# Patient Record
Sex: MALE | Race: WHITE | NOT HISPANIC OR LATINO | URBAN - METROPOLITAN AREA
[De-identification: names, ages, dates, MRNs, and addresses within clinical notes are randomized per-mention and may not be internally consistent; named-entity substitution may affect disease eponyms.]

---

## 2021-09-27 ENCOUNTER — APPOINTMENT (RX ONLY)
Dept: URBAN - METROPOLITAN AREA CLINIC 80 | Facility: CLINIC | Age: 31
Setting detail: DERMATOLOGY
End: 2021-09-27

## 2021-09-27 DIAGNOSIS — L0391 CELLULITIS AND ABSCESS OF UNSPECIFIED SITES: ICD-10-CM

## 2021-09-27 DIAGNOSIS — L0390 CELLULITIS AND ABSCESS OF UNSPECIFIED SITES: ICD-10-CM

## 2021-09-27 PROBLEM — L02.811 CUTANEOUS ABSCESS OF HEAD [ANY PART, EXCEPT FACE]: Status: ACTIVE | Noted: 2021-09-27

## 2021-09-27 PROBLEM — H60.01 ABSCESS OF RIGHT EXTERNAL EAR: Status: ACTIVE | Noted: 2021-09-27

## 2021-09-27 PROCEDURE — ? PRESCRIPTION

## 2021-09-27 PROCEDURE — ? ADDITIONAL NOTES

## 2021-09-27 PROCEDURE — ? DEFER

## 2021-09-27 PROCEDURE — ? RECOMMENDATIONS

## 2021-09-27 PROCEDURE — ? COUNSELING

## 2021-09-27 PROCEDURE — 99203 OFFICE O/P NEW LOW 30 MIN: CPT

## 2021-09-27 RX ORDER — CEPHALEXIN 500 MG/1
CAPSULE ORAL
Qty: 21 | Refills: 0 | Status: ERX | COMMUNITY
Start: 2021-09-27

## 2021-09-27 RX ORDER — MUPIROCIN 20 MG/G
OINTMENT TOPICAL BID
Qty: 22 | Refills: 2 | Status: ERX | COMMUNITY
Start: 2021-09-27

## 2021-09-27 RX ADMIN — CEPHALEXIN: 500 CAPSULE ORAL at 00:00

## 2021-09-27 RX ADMIN — MUPIROCIN: 20 OINTMENT TOPICAL at 00:00

## 2021-09-27 ASSESSMENT — LOCATION DETAILED DESCRIPTION DERM
LOCATION DETAILED: LEFT INFERIOR POSTAURICULAR SKIN
LOCATION DETAILED: RIGHT POSTAURICULAR CREASE

## 2021-09-27 ASSESSMENT — LOCATION SIMPLE DESCRIPTION DERM
LOCATION SIMPLE: RIGHT EAR
LOCATION SIMPLE: SCALP

## 2021-09-27 ASSESSMENT — LOCATION ZONE DERM
LOCATION ZONE: SCALP
LOCATION ZONE: EAR

## 2021-09-27 NOTE — PROCEDURE: DEFER
Introduction Text (Please End With A Colon): The following procedure was deferred: Excision
Procedure To Be Performed At Next Visit: Excision
Detail Level: Detailed

## 2021-09-27 NOTE — HPI: CYST
How Severe Is Your Cyst?: moderate
Is This A New Presentation, Or A Follow-Up?: Cysts
Additional History: Patient has been applying Bacitracin and Mometasone cream

## 2021-09-27 NOTE — PROCEDURE: RECOMMENDATIONS
Render Risk Assessment In Note?: no
Recommendation Preamble: The following recommendations were made during the visit: warm compresses
Detail Level: Zone

## 2025-04-07 ENCOUNTER — APPOINTMENT (OUTPATIENT)
Dept: URBAN - METROPOLITAN AREA CLINIC 79 | Facility: CLINIC | Age: 35
Setting detail: DERMATOLOGY
End: 2025-04-07

## 2025-04-07 DIAGNOSIS — T1490XA CONTUSION OF UNSPECIFIED SITE: ICD-10-CM | Status: INADEQUATELY CONTROLLED

## 2025-04-07 DIAGNOSIS — Q17.8 OTHER SPECIFIED CONGENITAL MALFORMATIONS OF EAR: ICD-10-CM

## 2025-04-07 PROBLEM — D48.5 NEOPLASM OF UNCERTAIN BEHAVIOR OF SKIN: Status: ACTIVE | Noted: 2025-04-07

## 2025-04-07 PROBLEM — S00.431A CONTUSION OF RIGHT EAR, INITIAL ENCOUNTER: Status: ACTIVE | Noted: 2025-04-07

## 2025-04-07 PROCEDURE — ? COUNSELING

## 2025-04-07 PROCEDURE — 99202 OFFICE O/P NEW SF 15 MIN: CPT | Mod: 25

## 2025-04-07 PROCEDURE — ? ADDITIONAL NOTES

## 2025-04-07 PROCEDURE — ? INCISION AND DRAINAGE OF AURICULAR HEMATOMA OR ABSCESS

## 2025-04-07 PROCEDURE — 69000 DRG XTRNL EAR ABSC/HEM SMPL: CPT | Mod: RT

## 2025-04-07 PROCEDURE — ? PRESCRIPTION MEDICATION MANAGEMENT

## 2025-04-07 PROCEDURE — ? PRESCRIPTION

## 2025-04-07 RX ORDER — CIPROFLOXACIN 500 MG/1
TABLET, FILM COATED ORAL
Qty: 14 | Refills: 0 | Status: ERX | COMMUNITY
Start: 2025-04-07

## 2025-04-07 RX ADMIN — CIPROFLOXACIN: 500 TABLET, FILM COATED ORAL at 00:00

## 2025-04-07 ASSESSMENT — LOCATION ZONE DERM: LOCATION ZONE: EAR

## 2025-04-07 ASSESSMENT — LOCATION SIMPLE DESCRIPTION DERM: LOCATION SIMPLE: RIGHT EAR

## 2025-04-07 ASSESSMENT — LOCATION DETAILED DESCRIPTION DERM: LOCATION DETAILED: RIGHT INFERIOR CRUS OF ANTIHELIX

## 2025-04-07 NOTE — PROCEDURE: INCISION AND DRAINAGE OF AURICULAR HEMATOMA OR ABSCESS
Detail Level: Detailed
Procedure: Simple incision and drainage of auricular hematoma
Medical Necessity Clause: The procedure was medically necessary due to one or more of the following: infection, severe pain, erythema, and warmth. These symptoms are either too severe to respond to conservative measures or have failed conservative measures, and, therefore, procedural intervention is medically indicated
Other Scalpel: 25 gauge needle aspiration
Other Bolster: dental roll and hypafix pressure dressing
Complications?: No
Consent: The risks, benefits, complications, and alternatives of incision and drainage of an auricular hematoma were discussed in detail.  Risks and complications include but are not limited to: pain, infection, bleeding, recurrence / reaccumulation of the hematoma or seroma, cosmetic deformity / cauliflower ear, and inability to reverse prior cartilage damage, scarring, or loss, among others.

## 2025-04-07 NOTE — PROCEDURE: ADDITIONAL NOTES
Additional Notes: 2 cc of sanguineous fluid drained via 25 gauge needle from the ear; consistent with auricular hematoma from patient traumatizing the ear (trying to pop a potential auricular pseudocyst in the area).  Pressure bandage applied and patient instructed to keep it on for 2 days to prevent blood/fluid from filling the dead space. 
Render Risk Assessment In Note?: no
Detail Level: Detailed

## 2025-04-08 ENCOUNTER — APPOINTMENT (OUTPATIENT)
Dept: URBAN - METROPOLITAN AREA CLINIC 52 | Facility: CLINIC | Age: 35
Setting detail: DERMATOLOGY
End: 2025-04-08

## 2025-04-08 DIAGNOSIS — T1490XA CONTUSION OF UNSPECIFIED SITE: ICD-10-CM | Status: INADEQUATELY CONTROLLED

## 2025-04-08 DIAGNOSIS — L72.8 OTHER FOLLICULAR CYSTS OF THE SKIN AND SUBCUTANEOUS TISSUE: ICD-10-CM | Status: INADEQUATELY CONTROLLED

## 2025-04-08 PROBLEM — S00.431A CONTUSION OF RIGHT EAR, INITIAL ENCOUNTER: Status: ACTIVE | Noted: 2025-04-08

## 2025-04-08 PROCEDURE — ? INCISION AND DRAINAGE OF AURICULAR HEMATOMA OR ABSCESS

## 2025-04-08 PROCEDURE — ? PRESCRIPTION MEDICATION MANAGEMENT

## 2025-04-08 PROCEDURE — ? ADDITIONAL NOTES

## 2025-04-08 PROCEDURE — 69000 DRG XTRNL EAR ABSC/HEM SMPL: CPT | Mod: RT

## 2025-04-08 PROCEDURE — ? COUNSELING

## 2025-04-08 PROCEDURE — 99214 OFFICE O/P EST MOD 30 MIN: CPT | Mod: 25

## 2025-04-08 ASSESSMENT — LOCATION ZONE DERM: LOCATION ZONE: EAR

## 2025-04-08 ASSESSMENT — LOCATION SIMPLE DESCRIPTION DERM: LOCATION SIMPLE: RIGHT EAR

## 2025-04-08 ASSESSMENT — LOCATION DETAILED DESCRIPTION DERM: LOCATION DETAILED: RIGHT INFERIOR CRUS OF ANTIHELIX

## 2025-04-08 NOTE — PROCEDURE: ADDITIONAL NOTES
Additional Notes: Reaccumulation from yesterday's drain\\n\\n2mL of sanguineous fluid drained via 18 gauge needle from the ear >> left open.\\n\\nContinue to suspect auricular hematoma s/p trauma\\n\\nPressure bandage applied and patient instructed to keep it on for 24 hours to prevent blood/fluid from filling the dead space.
Render Risk Assessment In Note?: no
Detail Level: Detailed

## 2025-04-10 ENCOUNTER — APPOINTMENT (OUTPATIENT)
Dept: URBAN - METROPOLITAN AREA CLINIC 52 | Facility: CLINIC | Age: 35
Setting detail: DERMATOLOGY
End: 2025-04-10

## 2025-04-10 DIAGNOSIS — L81.4 OTHER MELANIN HYPERPIGMENTATION: ICD-10-CM

## 2025-04-10 DIAGNOSIS — D18.0 HEMANGIOMA: ICD-10-CM

## 2025-04-10 DIAGNOSIS — L82.1 OTHER SEBORRHEIC KERATOSIS: ICD-10-CM

## 2025-04-10 PROBLEM — D18.01 HEMANGIOMA OF SKIN AND SUBCUTANEOUS TISSUE: Status: ACTIVE | Noted: 2025-04-10

## 2025-04-10 PROCEDURE — 99213 OFFICE O/P EST LOW 20 MIN: CPT | Mod: 24

## 2025-04-10 PROCEDURE — ? COUNSELING

## 2025-04-10 ASSESSMENT — LOCATION ZONE DERM: LOCATION ZONE: HAND

## 2025-04-10 ASSESSMENT — LOCATION DETAILED DESCRIPTION DERM
LOCATION DETAILED: RIGHT RADIAL DORSAL HAND
LOCATION DETAILED: RIGHT ULNAR DORSAL HAND

## 2025-04-10 ASSESSMENT — LOCATION SIMPLE DESCRIPTION DERM: LOCATION SIMPLE: RIGHT HAND

## 2025-04-12 ENCOUNTER — RX ONLY (RX ONLY)
Age: 35
End: 2025-04-12

## 2025-04-12 RX ORDER — DOXYCYCLINE HYCLATE 100 MG/1
CAPSULE, GELATIN COATED ORAL
Qty: 20 | Refills: 0 | Status: ERX | COMMUNITY
Start: 2025-04-12

## 2025-04-17 ENCOUNTER — APPOINTMENT (OUTPATIENT)
Dept: URBAN - METROPOLITAN AREA CLINIC 52 | Facility: CLINIC | Age: 35
Setting detail: DERMATOLOGY
End: 2025-04-17

## 2025-04-17 PROCEDURE — ? REASON FOR LEAVING

## 2025-04-21 ENCOUNTER — APPOINTMENT (OUTPATIENT)
Dept: URBAN - METROPOLITAN AREA CLINIC 79 | Facility: CLINIC | Age: 35
Setting detail: DERMATOLOGY
End: 2025-04-21

## 2025-04-21 DIAGNOSIS — Q17.8 OTHER SPECIFIED CONGENITAL MALFORMATIONS OF EAR: ICD-10-CM | Status: INADEQUATELY CONTROLLED

## 2025-04-21 PROCEDURE — ? COUNSELING

## 2025-04-21 PROCEDURE — 99214 OFFICE O/P EST MOD 30 MIN: CPT

## 2025-04-21 PROCEDURE — ? PRESCRIPTION MEDICATION MANAGEMENT

## 2025-04-21 ASSESSMENT — LOCATION DETAILED DESCRIPTION DERM: LOCATION DETAILED: RIGHT INFERIOR CRUS OF ANTIHELIX

## 2025-04-21 ASSESSMENT — LOCATION ZONE DERM: LOCATION ZONE: EAR

## 2025-04-21 ASSESSMENT — LOCATION SIMPLE DESCRIPTION DERM: LOCATION SIMPLE: RIGHT EAR

## 2025-04-21 NOTE — PROCEDURE: PRESCRIPTION MEDICATION MANAGEMENT
Detail Level: Zone
Initiate Treatment: bolster / pressure therapy for 2 weeks\\nhas home magnets\\ninstructed on proper use\\nf/u in 2 weeks
Render In Strict Bullet Format?: No

## 2025-05-05 ENCOUNTER — APPOINTMENT (OUTPATIENT)
Dept: URBAN - METROPOLITAN AREA CLINIC 79 | Facility: CLINIC | Age: 35
Setting detail: DERMATOLOGY
End: 2025-05-05

## 2025-05-05 DIAGNOSIS — Q17.8 OTHER SPECIFIED CONGENITAL MALFORMATIONS OF EAR: ICD-10-CM | Status: RESOLVING

## 2025-05-05 PROCEDURE — ? COUNSELING

## 2025-05-05 PROCEDURE — ? PRESCRIPTION MEDICATION MANAGEMENT

## 2025-05-05 PROCEDURE — 99214 OFFICE O/P EST MOD 30 MIN: CPT

## 2025-05-05 ASSESSMENT — LOCATION SIMPLE DESCRIPTION DERM: LOCATION SIMPLE: RIGHT EAR

## 2025-05-05 ASSESSMENT — LOCATION DETAILED DESCRIPTION DERM: LOCATION DETAILED: RIGHT INFERIOR CRUS OF ANTIHELIX

## 2025-05-05 ASSESSMENT — LOCATION ZONE DERM: LOCATION ZONE: EAR

## 2025-05-05 NOTE — PROCEDURE: PRESCRIPTION MEDICATION MANAGEMENT
Detail Level: Zone
Continue Regimen: bolster / pressure therapy for 2 weeks\\nhas home magnets\\ninstructed on proper use - as needed
Render In Strict Bullet Format?: No